# Patient Record
Sex: FEMALE | Race: WHITE | NOT HISPANIC OR LATINO | URBAN - METROPOLITAN AREA
[De-identification: names, ages, dates, MRNs, and addresses within clinical notes are randomized per-mention and may not be internally consistent; named-entity substitution may affect disease eponyms.]

---

## 2019-12-16 ENCOUNTER — APPOINTMENT (OUTPATIENT)
Dept: WOMENS IMAGING | Facility: HOSPITAL | Age: 49
End: 2019-12-16

## 2019-12-16 PROCEDURE — 76641 ULTRASOUND BREAST COMPLETE: CPT | Performed by: RADIOLOGY

## 2025-02-27 ENCOUNTER — OFFICE VISIT (OUTPATIENT)
Dept: INTERNAL MEDICINE | Facility: CLINIC | Age: 55
End: 2025-02-27
Payer: COMMERCIAL

## 2025-02-27 VITALS
BODY MASS INDEX: 20.78 KG/M2 | SYSTOLIC BLOOD PRESSURE: 160 MMHG | DIASTOLIC BLOOD PRESSURE: 90 MMHG | HEART RATE: 96 BPM | WEIGHT: 132.4 LBS | HEIGHT: 67 IN | OXYGEN SATURATION: 99 % | TEMPERATURE: 97.5 F

## 2025-02-27 DIAGNOSIS — Z13.220 SCREENING FOR HYPERLIPIDEMIA: ICD-10-CM

## 2025-02-27 DIAGNOSIS — Z00.00 ANNUAL PHYSICAL EXAM: Primary | ICD-10-CM

## 2025-02-27 DIAGNOSIS — H61.23 BILATERAL IMPACTED CERUMEN: ICD-10-CM

## 2025-02-27 DIAGNOSIS — Z11.4 ENCOUNTER FOR SCREENING FOR HIV: ICD-10-CM

## 2025-02-27 DIAGNOSIS — Z11.59 NEED FOR HEPATITIS C SCREENING TEST: ICD-10-CM

## 2025-02-27 DIAGNOSIS — F41.8 SITUATIONAL ANXIETY: ICD-10-CM

## 2025-02-27 DIAGNOSIS — R10.9 LEFT FLANK PAIN: ICD-10-CM

## 2025-02-27 RX ORDER — HYDROXYZINE HYDROCHLORIDE 25 MG/1
25 TABLET, FILM COATED ORAL 2 TIMES DAILY PRN
Qty: 30 TABLET | Refills: 0 | Status: SHIPPED | OUTPATIENT
Start: 2025-02-27

## 2025-02-27 NOTE — PROGRESS NOTES
"  Leandro Hernandez DO, [facility]  Internal Medicine  [facility]  [address], [address]  [place] [address]  [phone]    Chief Complaint  Annual checkup    HISTORY    oRlanda Sommer is a 54 y.o. female who presents to the office today as a  a new patient for their annual preventative exam.   Last PCP several years ago.     No hospitalization(s) within the last year.     Current exercise regimen: walks frequently . She feels she eats very well, does eat meat but \"90%\" vegetables.     Status of chronic medical conditions: none    Patient's overall comments about their health or any other specific health concerns they report:    States she checks her BP at home and it is 116/70 and always has in office high BP. \"Never over 120/70\".  She requests something for situational anxiety like coming into the doctors office.     Pt states she has left side pain, started 3 weeks ago. It is there most of the day, it is primarily on the side of the flank but can also be on the left sided low  back. Seems to be worse with \"worry\" , no specific movements make it worse. States her bowel movements are normal and no blood in urine or pain with urination. 4/10 pain severity. No radiation of the pain into the groin.     GYN History:     *Patient's GYN Practice: Dr Avila at [facility]    *last menstrual period 14 months ago    *Previous pregnancies: 2.  Live births: 1.  Miscarriages: 1 . Elective abortions: 0.      Health Maintenance Summary            Overdue - TDAP/TD VACCINES (1 - Tdap) Never done      No completion history exists for this topic.              Overdue - MAMMOGRAM (Every 2 Years) Never done      No completion history exists for this topic.              Overdue - COLORECTAL CANCER SCREENING (View Topic Details) Never done      No completion history exists for this topic.              Overdue - Pneumococcal Vaccine 50+ (1 of 1 - PCV) Never done      No completion history exists for this topic.              " "Overdue - ZOSTER VACCINE (1 of 2) Never done      No completion history exists for this topic.              Overdue - INFLUENZA VACCINE (Yearly - July to March) Never done      No completion history exists for this topic.              Overdue - COVID-19 Vaccine (1 - 2024-25 season) Never done      No completion history exists for this topic.              Overdue - HEPATITIS C SCREENING (Once) Order placed this encounter      No completion history exists for this topic.              Overdue - ANNUAL PHYSICAL (Yearly) Never done      No completion history exists for this topic.              Overdue - PAP SMEAR (Every 3 Years) Never done      No completion history exists for this topic.                     No Known Allergies     No outpatient medications have been marked as taking for the 2/27/25 encounter (Office Visit) with Leandro Hernandez DO.        Past Medical History:   Diagnosis Date    DDD (degenerative disc disease), lumbar      Past Surgical History:   Procedure Laterality Date    APPENDECTOMY      EXCISION BENIGN SKIN LESION TRUNK / ARM / LEG      right sided chest \"cyst benign\"     Family History   Problem Relation Age of Onset    Arthritis Mother     Heart attack Father 84    No Known Problems Sister     No Known Problems Brother     reports that she has never smoked. She has never used smokeless tobacco. She reports that she does not drink alcohol and does not use drugs.      There is no immunization history on file for this patient.     OBJECTIVE    Vital Signs:   /90   Pulse 96   Temp 97.5 °F (36.4 °C) (Infrared)   Ht 170.2 cm (67\")   Wt 60.1 kg (132 lb 6.4 oz)   SpO2 99%   BMI 20.74 kg/m²     Physical Exam  Vitals reviewed.   Constitutional:       General: She is not in acute distress.     Appearance: Normal appearance. She is normal weight. She is not ill-appearing.   HENT:      Head: Normocephalic and atraumatic.      Right Ear: External ear normal. There is impacted cerumen.      Left Ear: " External ear normal. There is impacted cerumen.      Mouth/Throat:      Mouth: Mucous membranes are moist.      Pharynx: No oropharyngeal exudate or posterior oropharyngeal erythema.   Eyes:      General: No scleral icterus.     Extraocular Movements: Extraocular movements intact.      Conjunctiva/sclera: Conjunctivae normal.      Pupils: Pupils are equal, round, and reactive to light.   Cardiovascular:      Rate and Rhythm: Regular rhythm. Tachycardia present.      Heart sounds: Normal heart sounds. No murmur heard.  Pulmonary:      Effort: Pulmonary effort is normal. No respiratory distress.      Breath sounds: Normal breath sounds. No wheezing.   Abdominal:      General: Bowel sounds are normal. There is no distension.      Palpations: Abdomen is soft. There is no mass.      Tenderness: There is abdominal tenderness. There is no guarding.      Hernia: No hernia is present.      Comments: left lateral flank pain extending to toward the iliac crest   Musculoskeletal:      Cervical back: Neck supple.      Right lower leg: No edema.      Left lower leg: No edema.   Lymphadenopathy:      Cervical: No cervical adenopathy.   Skin:     General: Skin is warm and dry.      Coloration: Skin is not jaundiced.   Neurological:      General: No focal deficit present.      Mental Status: She is alert and oriented to person, place, and time.      Cranial Nerves: No cranial nerve deficit.      Motor: No weakness.   Psychiatric:         Mood and Affect: Mood normal.         Behavior: Behavior normal.         Thought Content: Thought content normal.                         The ASCVD Risk score (Jeanette DK, et al., 2019) failed to calculate for the following reasons:    Cannot find a previous HDL lab    Cannot find a previous total cholesterol lab     ASSESSMENT & PLAN     #Annual Preventative Health Examination   -Age and sex appropriate physical exam performed and documented. Updated past medical, family, social and surgical  histories as well as allergies and care team list. Addressed care gaps listed in the medical record.  -Encouraged annual dental and vision exams as part of their overall health.  -Encouraged minimum of 30 minutes or more of exercise at a brisk walk or higher 5 days per week combined with a well-balanced diet.  -Immunizations reviewed and updated in EMR. Td, Prevnar 20 (Pneumococcal 20-valent conjugate), Shingrix, and COVID19 recommended.  -Lipid screening:  Will screen for hyperlipidemia today and calculate ASCVD risk if appropriate. .   -Aspirin for primary or secondary prevention: Will obtain lipid panel today and calculate 10-year ASCVD risk.   -Depression and Anxiety screening: Patient denies symptom of anxiety or depression.  -Diabetes screening:  Screening not indicated at this time.   -Tobacco use screening: Conducted and addressed if indicated.   -Alcohol use screening: Conducted and addressed if indicated.   -Illicit drug screening: Conducted and addressed if indicated.   -Hypertension screening: office based HTN but no HTN at home   -HIV screening: Will obtain one time HIV screen today.   -Syphilis screening: Syphilis screening not indicated.  -Hepatitis B virus screening: Screening not indicated, not in a high-risk group.  -Hepatitis C virus screening:  Will obtain one time screening today.  -Colon cancer screening: reports last 5 years ago and normal , asked her to obtain name of the facility so we can obtain results   -Lung cancer screening: Patient has never smoked.  -Cervical cancer screening: last PAP smear in 2018, states she will schedule with her GYN  -Breast cancer screening:  doesn't recall last mammogram. She states she has severe anxiety regarding mammogram  and doesn't want to pursue any breast cancer screening currently, even with ultrasounds.   -BRCA related cancer screening:  Patient does not have a known personal or family history.   -Osteoporosis screening: Informed patient that the  "USPSTF recommends screening for osteoporosis with bone measurement testing to prevent osteoporotic fractures in women 65 years and older. Screening is not applicable at this time.    Follow up in 1 year for annual physical exam.    Patient/family had no further questions at this time and verbalized understanding of the plan discussed today. .    A problem-based visit was also conducted on the same day, see below for assessment and plan    Diagnoses and all orders for this visit:    1. Left flank pain (Primary)  -Pt states she has left side pain, started 3 weeks ago. It is there most of the day, it is primarily on the side of the flank but can also be on the left sided low  back. Seems to be worse with \"worry\" , no specific movements make it worse. States her bowel movements are normal and no blood in urine or pain with urination. 4/10 pain severity. No radiation of the pain into the groin.   -exam with slight TTP in the left flank extending back toward the left iliac crest . She states the pain around the left iliac crest feels like her chronic known lumbar disc disease pain and not the pain she experiences new.  -check CMP, CBC, UA with microscopic  -in regards to further evaluation, pt states the only image she is willing to consent to at this time is an abdominal ultrasound. I advised pt that is not the most detailed image , but has no risks and I am OK starting there but it may not reveal a clear etiology. She states if it doesn't she would like to pursue an abdominal/pelvis MRI and would pay out of pocked for it if insurance didn't cover it. Will start with Ultrasound of the abdomen first in the area of concern and make further plan based on results.     2. Situational anxiety  -     hydrOXYzine (ATARAX) 25 MG tablet; Take 1 tablet by mouth 2 (Two) Times a Day As Needed for Anxiety.  Dispense: 30 tablet; Refill: 0    3. Bilateral impacted cerumen  -begin ear wax softening drops otc             The following " social determinates of health impact the patient's medical decision making: No social determinates of health were factored in to today's visit.     Follow Up  Return in about 2 months (around 4/27/2025) for Recheck.

## 2025-02-28 LAB
ALBUMIN SERPL-MCNC: 4.9 G/DL (ref 3.8–4.9)
ALP SERPL-CCNC: 54 IU/L (ref 44–121)
ALT SERPL-CCNC: 22 IU/L (ref 0–32)
APPEARANCE UR: CLEAR
AST SERPL-CCNC: 20 IU/L (ref 0–40)
BACTERIA #/AREA URNS HPF: NORMAL /[HPF]
BASOPHILS # BLD AUTO: 0 X10E3/UL (ref 0–0.2)
BASOPHILS NFR BLD AUTO: 1 %
BILIRUB SERPL-MCNC: 0.3 MG/DL (ref 0–1.2)
BILIRUB UR QL STRIP: NEGATIVE
BUN SERPL-MCNC: 10 MG/DL (ref 6–24)
BUN/CREAT SERPL: 14 (ref 9–23)
CALCIUM SERPL-MCNC: 9.8 MG/DL (ref 8.7–10.2)
CASTS URNS QL MICRO: NORMAL /LPF
CHLORIDE SERPL-SCNC: 102 MMOL/L (ref 96–106)
CHOLEST SERPL-MCNC: 213 MG/DL (ref 100–199)
CO2 SERPL-SCNC: 23 MMOL/L (ref 20–29)
COLOR UR: YELLOW
CREAT SERPL-MCNC: 0.7 MG/DL (ref 0.57–1)
EGFRCR SERPLBLD CKD-EPI 2021: 102 ML/MIN/1.73
EOSINOPHIL # BLD AUTO: 0 X10E3/UL (ref 0–0.4)
EOSINOPHIL NFR BLD AUTO: 0 %
EPI CELLS #/AREA URNS HPF: NORMAL /HPF (ref 0–10)
ERYTHROCYTE [DISTWIDTH] IN BLOOD BY AUTOMATED COUNT: 13 % (ref 11.7–15.4)
GLOBULIN SER CALC-MCNC: 2.6 G/DL (ref 1.5–4.5)
GLUCOSE SERPL-MCNC: 103 MG/DL (ref 70–99)
GLUCOSE UR QL STRIP: NEGATIVE
HCT VFR BLD AUTO: 39.3 % (ref 34–46.6)
HCV IGG SERPL QL IA: NON REACTIVE
HDLC SERPL-MCNC: 77 MG/DL
HGB BLD-MCNC: 12.7 G/DL (ref 11.1–15.9)
HGB UR QL STRIP: NEGATIVE
HIV 1+2 AB+HIV1 P24 AG SERPL QL IA: NON REACTIVE
IMM GRANULOCYTES # BLD AUTO: 0 X10E3/UL (ref 0–0.1)
IMM GRANULOCYTES NFR BLD AUTO: 1 %
KETONES UR QL STRIP: ABNORMAL
LDLC SERPL CALC-MCNC: 127 MG/DL (ref 0–99)
LEUKOCYTE ESTERASE UR QL STRIP: ABNORMAL
LYMPHOCYTES # BLD AUTO: 1.5 X10E3/UL (ref 0.7–3.1)
LYMPHOCYTES NFR BLD AUTO: 29 %
MCH RBC QN AUTO: 30.3 PG (ref 26.6–33)
MCHC RBC AUTO-ENTMCNC: 32.3 G/DL (ref 31.5–35.7)
MCV RBC AUTO: 94 FL (ref 79–97)
MICRO URNS: ABNORMAL
MONOCYTES # BLD AUTO: 0.3 X10E3/UL (ref 0.1–0.9)
MONOCYTES NFR BLD AUTO: 6 %
NEUTROPHILS # BLD AUTO: 3.2 X10E3/UL (ref 1.4–7)
NEUTROPHILS NFR BLD AUTO: 63 %
NITRITE UR QL STRIP: NEGATIVE
PH UR STRIP: 5.5 [PH] (ref 5–7.5)
PLATELET # BLD AUTO: 290 X10E3/UL (ref 150–450)
POTASSIUM SERPL-SCNC: 3.7 MMOL/L (ref 3.5–5.2)
PROT SERPL-MCNC: 7.5 G/DL (ref 6–8.5)
PROT UR QL STRIP: NEGATIVE
RBC # BLD AUTO: 4.19 X10E6/UL (ref 3.77–5.28)
RBC #/AREA URNS HPF: NORMAL /HPF (ref 0–2)
SODIUM SERPL-SCNC: 142 MMOL/L (ref 134–144)
SP GR UR STRIP: 1.01 (ref 1–1.03)
TRIGL SERPL-MCNC: 51 MG/DL (ref 0–149)
TSH SERPL DL<=0.005 MIU/L-ACNC: 1.29 UIU/ML (ref 0.45–4.5)
UROBILINOGEN UR STRIP-MCNC: 0.2 MG/DL (ref 0.2–1)
VLDLC SERPL CALC-MCNC: 9 MG/DL (ref 5–40)
WBC # BLD AUTO: 5.1 X10E3/UL (ref 3.4–10.8)
WBC #/AREA URNS HPF: NORMAL /HPF (ref 0–5)

## 2025-03-02 LAB
HBA1C MFR BLD: 5.6 % (ref 4.8–5.6)
WRITTEN AUTHORIZATION: NORMAL

## 2025-03-06 ENCOUNTER — HOSPITAL ENCOUNTER (OUTPATIENT)
Dept: ULTRASOUND IMAGING | Facility: HOSPITAL | Age: 55
Discharge: HOME | End: 2025-03-06
Admitting: STUDENT IN AN ORGANIZED HEALTH CARE EDUCATION/TRAINING PROGRAM
Payer: COMMERCIAL

## 2025-03-06 ENCOUNTER — TELEPHONE (OUTPATIENT)
Dept: INTERNAL MEDICINE | Facility: CLINIC | Age: 55
End: 2025-03-06
Payer: COMMERCIAL

## 2025-03-06 DIAGNOSIS — R10.9 LEFT FLANK PAIN: ICD-10-CM

## 2025-03-06 PROCEDURE — 76705 ECHO EXAM OF ABDOMEN: CPT

## 2025-03-06 NOTE — TELEPHONE ENCOUNTER
Caller: Rolanda Sommer    Relationship: Self    Best call back number: [phone]         What test was performed: ULTRA SOUND    When was the test performed: 3/6/25    Where was the test performed: [facility]     Additional notes: PATIENT CALLED ASKING FOR A CALL TO DISCUSS ULTRASOUND DONE THIS MORNING.

## 2025-03-07 NOTE — TELEPHONE ENCOUNTER
Patient aware we have not gotten the results from [facility]. But she will get a message on [employer] from Dr. Hernandez or a phone call. Patient verbalized understanding.